# Patient Record
Sex: MALE | Race: WHITE | URBAN - METROPOLITAN AREA
[De-identification: names, ages, dates, MRNs, and addresses within clinical notes are randomized per-mention and may not be internally consistent; named-entity substitution may affect disease eponyms.]

---

## 2017-09-06 ENCOUNTER — HISTORICAL (OUTPATIENT)
Dept: ADMINISTRATIVE | Facility: HOSPITAL | Age: 22
End: 2017-09-06

## 2017-09-06 LAB
BUN SERPL-MCNC: 12 MG/DL (ref 7–18)
CALCIUM SERPL-MCNC: 9 MG/DL (ref 8.5–10.1)
CHLORIDE SERPL-SCNC: 103 MMOL/L (ref 98–107)
CO2 SERPL-SCNC: 32 MMOL/L (ref 21–32)
CREAT SERPL-MCNC: 0.9 MG/DL (ref 0.6–1.3)
ERYTHROCYTE [DISTWIDTH] IN BLOOD BY AUTOMATED COUNT: 12.5 % (ref 11.5–14.5)
GLUCOSE SERPL-MCNC: 85 MG/DL (ref 74–106)
HCT VFR BLD AUTO: 47.8 % (ref 40–51)
HGB BLD-MCNC: 16.4 GM/DL (ref 13.5–17.5)
MCH RBC QN AUTO: 30.4 PG (ref 26–34)
MCHC RBC AUTO-ENTMCNC: 34.3 GM/DL (ref 31–37)
MCV RBC AUTO: 88.7 FL (ref 80–100)
PLATELET # BLD AUTO: 312 X10(3)/MCL (ref 130–400)
PMV BLD AUTO: 9 FL (ref 7.4–10.4)
POTASSIUM SERPL-SCNC: 3.8 MMOL/L (ref 3.5–5.1)
RBC # BLD AUTO: 5.39 X10(6)/MCL (ref 4.5–5.9)
SODIUM SERPL-SCNC: 140 MMOL/L (ref 136–145)
WBC # SPEC AUTO: 6.4 X10(3)/MCL (ref 4.5–11)

## 2017-09-07 ENCOUNTER — HISTORICAL (OUTPATIENT)
Dept: SURGERY | Facility: HOSPITAL | Age: 22
End: 2017-09-07

## 2017-10-18 ENCOUNTER — HISTORICAL (OUTPATIENT)
Dept: ADMINISTRATIVE | Facility: HOSPITAL | Age: 22
End: 2017-10-18

## 2022-04-10 ENCOUNTER — HISTORICAL (OUTPATIENT)
Dept: ADMINISTRATIVE | Facility: HOSPITAL | Age: 27
End: 2022-04-10

## 2022-04-25 VITALS
SYSTOLIC BLOOD PRESSURE: 133 MMHG | HEIGHT: 66 IN | BODY MASS INDEX: 27.07 KG/M2 | DIASTOLIC BLOOD PRESSURE: 85 MMHG | WEIGHT: 168.44 LBS

## 2022-04-30 NOTE — OP NOTE
DATE OF SURGERY:    09/07/2017    ATTENDING SURGEON:  Liu Lew MD    PREOPERATIVE DIAGNOSIS:  Left lateral malleolus fracture.    POSTOPERATIVE DIAGNOSIS:    1. Left lateral malleolus fracture.  2. Left ankle syndesmotic disruption.    PROCEDURE PERFORMED:    1. Open reduction, internal fixation of left lateral malleolus.  2. Open reduction, internal fixation of distal tibia-fibula joint     RESIDENT SURGEON:  Forrest Berman MD.    ANESTHESIA:  General.    COMPLICATIONS:  None.    FINDINGS:  Consistent with preop evaluation along with a syndesmotic disruption with instability on fluoroscopic examination.    ESTIMATED BLOOD LOSS:  Minimal.    IMPLANTS:  Arthrex 1/3 tubular plate with corresponding screws as well as an Arthrex tightrope and two 2.7 mm lag screws.    OPERATIVE INDICATIONS:  The patient is a 22-year-old male who sustained a twisting injury to his left ankle approximately 3 weeks ago.  He was seen and evaluated in clinic, found to have a lateral malleolus fracture with significant displacement and mild amount of increased talar tilt.  The risks, benefits and alternatives were discussed.  Due to the significant amount of displacement of the lateral malleolus fracture and the possibility for syndesmotic injury, a surgical intervention was offered.  Patient elected to proceed with surgical intervention.  Written consent was obtained.    OPERATIVE DETAILS:  The patient was seen and evaluated in same-day surgery, found to be in good condition for surgery.  The left lower extremity was marked.  H and P was updated and consents were checked.     Patient was taken to the operative suite, administered general anesthesia.  The left lower extremity was prepped and draped in normal sterile fashion.  A time-out was performed confirming correct patient, correct operative site as well as the administration of appropriate preoperative antibiotics and the consents for the appropriate planned procedure.  An Esmarch was  utilized and tourniquet was inflated to 250 mmHg for a total tourniquet time of 100 minutes.  A standard lateral approach was made to the fibula.  The fibular fracture was fully visualized.  It was then completely mobilized.  The local soft callus was debrided completely from the fracture site and fracture was completely mobilized.  It was then with a series of clamps mobilized into good reduction as identified under direct visualization.  At this time, we placed two 2.7 mm lag screws perpendicular to the proximal fracture fragment and then perpendicular to the more distal fracture fragment.  X-ray confirmed a good reduction of the fracture as well as appropriate placement of the lag screws.  We then placed a 1/3 tubular plate of appropriate length on the posterior lateral aspect of the fibula and placed a cortical screw proximally, another cortical screw proximally and then 2 locking screws distally.  Fluoroscopy confirmed appropriate reduction and placement of all hardware.  We then placed another cortical screw proximally.  At this time, the fracture was found to be very well reduced and the plate and all screws were in appropriate location, both under direct visualization and fluoroscopic examination.  We then turned our attention to identifying the syndesmotic injury.  We compared AP, lateral and oblique images as well as the external rotation stress image to preoperative images of the contralateral extremity, found there was a mild amount of increased medial clear space.  We then also performed a fluoroscopic examination with an AP stress test with the fibula using a clamp and found there was significant fibular mobility and due to this, we elected to proceed with a tight rope fixation of the syndesmosis.  In standard fashion, we placed a tightrope at a level that allowed us to avoid going through the fibular fracture site.  We placed it in standard fashion from lateral to medial, insuring that it was parallel  to the tibia talar articulation as well as appropriate location on the lateral fluoroscopic imaging.  We then placed a clamp across the syndesmosis in good location as directed by fluoroscopy to hold the reduction and the tightrope was then successively brought down to the fibula.  X-ray confirmed appropriate reduction of the syndesmosis, appropriate placement of the tightrope with good placement directly on bone both medially and laterally.  We then re-stressed the syndesmosis under fluoroscopy and found that it was stable.  The wound was then copiously irrigated and closed in layered standard fashion.  Dressings were applied.  Splint was applied.  The patient was taken to the PACU having suffered no acute issues.      POSTOPERATIVE PLAN:  The patient will be nonweightbearing for 6 weeks and then gradual return to weightbearing as tolerated in a Cam boot at 2 weeks, we will transition him from a splint to a Cam boot and allow for gentle range of motion when non ambulatory.        ______________________________  NORIS MENDIETA MD    ______________________________  Liu IZAGUIRRE/SANDRA  DD:  09/07/2017  Time:  12:46PM  DT:  09/07/2017  Time:  01:20PM  Job #:  [id number]

## 2022-04-30 NOTE — H&P
* Final Report *   Document Contains Addenda  Chief Complaint left fibula fracture History of Present Illness 22M was playing softball while inebriated 2 weeks ago and slid. Seen at OSH and dx'd with CRISTINA CASTANON. Here today for initial eval. Has been NWB in [facility]. Pain is well- controlled Review of Systems Constitutional no fevers, chills  Eye neg  ENMT neg  Respiratory no shortness of breath  Cardiovascular no chest pain  Gastrointestinal no abdominal pain  Genitourinary neg  Hema/Lymph no easy bruising  Endocrine neg  Immunologic neg  Musculoskeletal as above  Integumentary as above  Neurologic as above  All Other ROS negative except as stated above Physical Exam Vitals & Measurements T: 37.1 °C (Oral) HR: 74 (Peripheral) RR: 20 BP: 133/85   HT: 168 cm HT: 168 cm WT: 75.2 kg WT: 75.2 kg BMI: 26.64 Gen: NAD, A&Ox3  Cardiac: RRR by PP  Pulm: non-labored WOB  Abd: soft, nt, nd  LLE  Diffuse ecchymosis about the dorsum of foot and lateral malleolus  Medial sided TTP over deltoid  ROM WNL but painful  NVID Assessment/Plan 1. Displaced fracture of lateral malleolus of left fibula, initial encounter for closed fracture discussed risks, benefits and alternatives of non-op vs op treatment including improved ankle mechanics and decreased risk of arthritis with surgery and pt would like to proceed.   placed into [facility]  Continue NWB LLE  Consented and booked for Thurs 9/7   Ordered:   Clinic Follow-up PRN  Problem List/Past Medical History Ongoing Tobacco user Historical Medications Norco 7.5 mg-325 mg oral tablet, 1 tab(s), Oral, q6hr, PRN Allergies No Known Allergies Social History   Alcohol - 08/20/2017   Current, 1-2 times per month   Tobacco - 08/20/2017   Current some day smoker, Cigarettes Diagnostic Results xrays from OSH reveal michaela CASTANON fx with very small displaced posterior mal piece   Addendum by Mick SIMONS, Ren BAZZI on September 01, 2017 10:14:08 CDT (Verified)  Avelino's medical history, physical exam findings left ankle  , diagnosis, and treatment outlined by Dr. Kent has been reviewed. Operative treatment plan is determined to be reasonable and appropriate. I was present during the evaluation. X-rays reviewed. [id number]      Addendum by Ren Barton MD on September 01, 2017 10:16:20 CDT (Verified)  Smoking cessation is important.  Addendum by Forrest Berman MD on September 07, 2017 06:04:26 CDT (Verified)  No interval change in HP  Result type: Office/Clinic Note-Physician   Result date: September 01, 2017 10:03 CDT   Result status: Modified   Result title: Office Visit Note   Performed by: Rafael Kent MD on September 01, 2017 10:04 CDT   Verified by: Rafael Kent MD on September 01, 2017 10:04 CDT   Encounter info: [id number], [facility], Clinic Visit, 9/1/2017 - 9/1/2017   Doc has been moved by HIM specialist.